# Patient Record
Sex: FEMALE | Race: WHITE | NOT HISPANIC OR LATINO | Employment: FULL TIME | URBAN - METROPOLITAN AREA
[De-identification: names, ages, dates, MRNs, and addresses within clinical notes are randomized per-mention and may not be internally consistent; named-entity substitution may affect disease eponyms.]

---

## 2023-12-21 ENCOUNTER — TELEPHONE (OUTPATIENT)
Dept: NEUROLOGY | Facility: CLINIC | Age: 28
End: 2023-12-21

## 2023-12-21 NOTE — TELEPHONE ENCOUNTER
Patricia Ribeiro called to sche a New Patient appt - no referral so created a self referral. Patient was triaged, sent over to provider, advised of process, referral status was updated, insurance was verified.    [employer] (not a medicaid plan)    [id number]    Group [id number]    Plan [id number]

## 2024-01-22 ENCOUNTER — TELEPHONE (OUTPATIENT)
Dept: NEUROLOGY | Facility: CLINIC | Age: 29
End: 2024-01-22

## 2024-01-23 ENCOUNTER — TELEPHONE (OUTPATIENT)
Dept: NEUROLOGY | Facility: CLINIC | Age: 29
End: 2024-01-23

## 2024-01-23 NOTE — TELEPHONE ENCOUNTER
Patient called to reschedule her appt for today. Patient was rescheduled for 02/15 at 1 pm with Dr. Stewart

## 2024-02-14 ENCOUNTER — TELEPHONE (OUTPATIENT)
Dept: NEUROLOGY | Facility: CLINIC | Age: 29
End: 2024-02-14
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
## 2024-02-15 ENCOUNTER — OFFICE VISIT (OUTPATIENT)
Dept: NEUROLOGY | Facility: CLINIC | Age: 29
End: 2024-02-15
Payer: COMMERCIAL

## 2024-02-15 VITALS
HEIGHT: 67 IN | WEIGHT: 169.8 LBS | HEART RATE: 62 BPM | BODY MASS INDEX: 26.65 KG/M2 | SYSTOLIC BLOOD PRESSURE: 120 MMHG | DIASTOLIC BLOOD PRESSURE: 80 MMHG

## 2024-02-15 DIAGNOSIS — R11.0 NAUSEA: ICD-10-CM

## 2024-02-15 DIAGNOSIS — G43.009 MIGRAINE WITHOUT AURA AND WITHOUT STATUS MIGRAINOSUS, NOT INTRACTABLE: Primary | ICD-10-CM

## 2024-02-15 PROCEDURE — 99204 OFFICE O/P NEW MOD 45 MIN: CPT | Performed by: PSYCHIATRY & NEUROLOGY

## 2024-02-15 RX ORDER — ONDANSETRON 4 MG/1
4 TABLET, FILM COATED ORAL AS NEEDED
Qty: 10 TABLET | Refills: 1 | Status: SHIPPED | OUTPATIENT
Start: 2024-02-15

## 2024-02-15 RX ORDER — NORETHINDRONE ACETATE AND ETHINYL ESTRADIOL, ETHINYL ESTRADIOL AND FERROUS FUMARATE 1MG-10(24)
1 KIT ORAL DAILY
COMMUNITY
Start: 2024-01-10 | End: 2025-01-09

## 2024-02-15 RX ORDER — VALACYCLOVIR HYDROCHLORIDE 500 MG/1
500 TABLET, FILM COATED ORAL DAILY
COMMUNITY
Start: 2024-01-10

## 2024-02-15 RX ORDER — MULTIVIT WITH CALCIUM,IRON,MIN
1 TABLET ORAL DAILY
COMMUNITY

## 2024-02-15 RX ORDER — RIZATRIPTAN BENZOATE 10 MG/1
10 TABLET ORAL
COMMUNITY
Start: 2024-01-10 | End: 2025-01-09

## 2024-02-15 RX ORDER — SUMATRIPTAN 100 MG/1
100 TABLET, FILM COATED ORAL ONCE AS NEEDED
COMMUNITY

## 2024-02-15 RX ORDER — PROTRIPTYLINE HYDROCHLORIDE 5 MG/1
5 TABLET, FILM COATED ORAL
Qty: 30 TABLET | Refills: 1 | Status: SHIPPED | OUTPATIENT
Start: 2024-02-15
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
## 2024-02-15 NOTE — ASSESSMENT & PLAN NOTE
28 y.o. right handed female who presents to the neurology clinic for evaluation of migraines that began in 2019 after childbirth. Vaginal delivery, no epidural use. Had migraines prior to childbirth as well, but very rare. Currently, has 2-4 severe migraines per month, but always has a mild headache every other day. Holocephalic, intense pressure sensation. Sometimes radiates down her neck. Has associated nausea, light sensitivity. No associated visual changes, no focal numbness or weakness, no dysphagia or dysarthria. Headache is significantly worse when she gets up or stands up. Patient used to follow with [facility] neuro for management of her headaches. She underwent MRI brain, MRA on 1/10/2023- both of which were negative. During an acute attack, patient has some level of disability: cannot work, has to call out. Has had to call out sick 2-3x/month.    Current medications:  -Preventative: none.   -Abortive: sumatriptan 100mg, rizatriptan 10mg    With medication, headache lasts for 2 hrs on average. Without medication, headache lasts for 2-3days on average.      Neuro exam shows mild L eye ptosis (chronic), but otherwise normal findings.     Impression:  Chronic migraine w/out aura. There seems to be some component of increased ICP as headache is better when lying down, but no radiographic evidence of high ICP. Therefore, she may be experiencing transient increase in ICP during migraine attacks.       Plan:   Abortive meds: Imitrex 100 mg/Maxalt 10 mg+ 600 mg Advil. If still having pain after 2 hours, can take another 1 tab of triptan. Zofran 4mg PRN.   Pt has both Imitrex, Maxalt and takes each med alternatively.   Preventative meds: Protriptyline 5mg QD at 2PM (avoided amitriptyline due to challenging work schedule and to avoid sleepiness). May increase to 10mg QD if not effective.   Of note, avoided topamax due to hx of kidney stones.  Counseled on abortive, preventative meds' side effects extensively; patient  verbalized understanding.  Limit OTC meds such as Tylenol, Ibuprofen, Aleve, Excedrin. (No more than 2- 3 times a week or max 10 a month).  Magnesium Oxide- 400mg QD, Vitamin B2- 200 mg BID supplements.  Maintain a regular schedule with adequate sleep (sleep hygiene), blue filter film screens for computers and phones, theraspecs or axon sunglasses (FL41 monty film), diet (not skipping/late meals), hydration (60-80 oz of water), exercise, decrease consumption of caffeine (less than 2 cups per day), limit artificial sweeteners, coping mechanisms for stress, CBTs and biofeedback.   Regular exercise   Maintain a migraine diary -document headaches.   F/u with Neurology in 3-4 months or sooner in case of worsening headaches or w/ new associated neurologic syx.

## 2024-02-15 NOTE — PATIENT INSTRUCTIONS
"Medication instructions:   Instructions for taking abortive meds (Imitrex/Maxalt):    -Take w/in 5 mins of headache onset   -May take an extra dose within 2 hrs of first dose. DO NOT take more than 2 pills in 24 hrs.   -DO NOT take more than 3 pills of abortive meds within 1 week due to high risk of rebound headaches.    Instructions for preventative meds (Vivactil 5mg at 2PM):   -Take every day even if you are not having a headache.   - All preventive treatments may take at least 4-6 weeks to start causing a change in your headache. The goal of preventive medication is to decrease the frequency of headache by at least 50% over a period of weeks to months.     General headache management instructions:   When you have a moderate to severe headache, you should seek rest, initiate relaxation and apply cold compresses to the head.  Limit over the counter medications such as Tylenol, Ibuprofen, Aleve, Excedrin. (No more than 2- 3 times a week or max 10 a month).  Maintain headache diary.  Free NEFTALI for a smart phone, which can be used is \"Migraine edelmira\"  Limit caffeine to 1-2 cups 8 to 16 oz a day or less.  Avoid dietary trigger. (aged cheese, peanuts, MSG, aspartame and nitrates)  Patient is to have regular frequent meals to prevent headache onset.    Please drink at least 64 ounces of water a day to help remain hydrated.    Medication overuse headaches:   Avoid medications with narcotics, barbiturates, or caffeine in them as these can cause rebound headaches after very few doses and can interfere with other headache medicine efficacy. Taking  any acute/abortive over the counter medication or prescription drugs for more than 2-3 days a week can cause medication overuse headache.     Non-pharmacologic treatments:   Maintain a regular schedule with adequate sleep (sleep hygiene), blue filter film screens for computers and phones, theraspecs or axon sunglasses (FL41 monty film), diet (not skipping/late meals), hydration " "(60-80 oz of water), exercise, decrease consumption of caffeine (less than 2 cups per day) and cut out artificial sweeteners, coping mechanisms for stress, and cognitive behavioral therapies and biofeedback.   Recommend exercising regularly.     Over-the-counter supplements:   Magnesium Oxide 400mg a day. If any diarrhea or upset stomach, decrease dose  as tolerated  Vitamin B2 200 mg twice a day. May cause urine to turn yellow which is normal for B 2 to do and is not a sign that you are dehydrated.       If you experience \"red flag\" headache symptoms including symptoms such as facial droop on one side, weakness/paralysis on either side, speech trouble, numbness on one side, balance issues, any vision changes, extreme dizziness or significant increase in severity of headache or a sudden onset severe headache, patient is to call 9-1-1 immediately or to proceed to the nearest ER.     Call our office in 1 month to inform us how the medication is working. Depending on how things are going, we can adjust the medications.   Return in about 3 months (around 5/15/2024).    "

## 2024-02-16 ENCOUNTER — APPOINTMENT (EMERGENCY)
Dept: RADIOLOGY | Facility: HOSPITAL | Age: 29
End: 2024-02-16
Payer: OTHER MISCELLANEOUS

## 2024-02-16 ENCOUNTER — HOSPITAL ENCOUNTER (EMERGENCY)
Facility: HOSPITAL | Age: 29
Discharge: HOME | End: 2024-02-16
Attending: EMERGENCY MEDICINE
Payer: OTHER MISCELLANEOUS

## 2024-02-16 VITALS
DIASTOLIC BLOOD PRESSURE: 85 MMHG | HEART RATE: 75 BPM | OXYGEN SATURATION: 97 % | RESPIRATION RATE: 18 BRPM | TEMPERATURE: 97.8 F | SYSTOLIC BLOOD PRESSURE: 145 MMHG

## 2024-02-16 DIAGNOSIS — S60.222A CONTUSION OF LEFT HAND, INITIAL ENCOUNTER: Primary | ICD-10-CM

## 2024-02-16 PROCEDURE — 99284 EMERGENCY DEPT VISIT MOD MDM: CPT | Performed by: EMERGENCY MEDICINE

## 2024-02-16 PROCEDURE — 73130 X-RAY EXAM OF HAND: CPT

## 2024-02-16 PROCEDURE — 99283 EMERGENCY DEPT VISIT LOW MDM: CPT

## 2024-02-16 RX ORDER — NAPROXEN 250 MG/1
250 TABLET ORAL 2 TIMES DAILY WITH MEALS
Qty: 20 TABLET | Refills: 0 | Status: SHIPPED | OUTPATIENT
Start: 2024-02-16

## 2024-02-16 RX ORDER — IBUPROFEN 600 MG/1
600 TABLET ORAL ONCE
Status: COMPLETED | OUTPATIENT
Start: 2024-02-16 | End: 2024-02-16

## 2024-02-16 RX ADMIN — IBUPROFEN 600 MG: 600 TABLET ORAL at 21:22

## 2024-02-16 NOTE — Clinical Note
Patricia Ribeiro was seen and treated in our emergency department on 2/16/2024.    No restrictions            Diagnosis:     Patricia  may return to work on return date.    She may return on this date: 02/16/2024         If you have any questions or concerns, please don't hesitate to call.      Maya Rooney, ASHISH    ______________________________           _______________          _______________  Hospital Representative                              Date                                Time

## 2024-02-17 NOTE — ED PROVIDER NOTES
Pt Name: Patricia Ribeiro  MRN: [id number]  Birthdate 1995  Age/Sex: 28 y.o. female  Date of evaluation: 2/16/2024  PCP: Reema Berman MD    CHIEF COMPLAINT    Chief Complaint   Patient presents with    Hand Injury     Pt reports closed L hand in door around 8pm.          HPI    28 y.o. female presenting with left hand pain and bruising.  Patient states that she closed her left hand in a door approximately an hour prior to evaluation.  The pain in the hand is dull, moderate intensity, overlying the third and fourth metacarpal, worse with pressing the area moving the hand and better at rest.  She denies weakness, any other pain or injuries, other symptoms.      HPI      Past Medical and Surgical History    Past Medical History:   Diagnosis Date    Migraine without aura and without status migrainosus, not intractable        History reviewed. No pertinent surgical history.    Family History   Problem Relation Age of Onset    Allergic rhinitis Mother        Social History     Tobacco Use    Smoking status: Never   Vaping Use    Vaping status: Never Used   Substance Use Topics    Alcohol use: Not Currently    Drug use: Never           Allergies    No Known Allergies    Home Medications    Prior to Admission medications    Medication Sig Start Date End Date Taking? Authorizing Provider   azelastine (ASTELIN) 0.1 % nasal spray 1 spray into each nostril 2 (two) times a day Use in each nostril as directed 6/1/22   Thomas Goldstein MD   ipratropium (ATROVENT) 0.06 % nasal spray 2 sprays into each nostril in the morning 6/1/22   Thomas Goldstein MD   Lo Loestrin Fe 1 MG-10 MCG / 10 MCG TABS Take 1 tablet by mouth daily 1/10/24 1/9/25  Historical Provider, MD   Multiple Vitamins-Minerals (Multiple Vitamins/Womens) tablet Take 1 tablet by mouth daily    Historical Provider, MD   ondansetron (Zofran) 4 mg tablet Take 1 tablet (4 mg total) by mouth if needed for nausea or vomiting 2/15/24   Jesusita Stewart MD    protriptyline (VIVACTIL) 5 MG tablet Take 1 tablet (5 mg total) by mouth daily at bedtime 2/15/24   Jesusita Stewart MD   rizatriptan (MAXALT) 10 mg tablet Take 10 mg by mouth 1/10/24 1/9/25  Historical Provider, MD   SUMAtriptan (IMITREX) 100 mg tablet Take 100 mg by mouth once as needed for migraine    Historical Provider, MD   valACYclovir (VALTREX) 500 mg tablet Take 500 mg by mouth daily 1/10/24   Historical Provider, MD           Review of Systems    Review of Systems   Constitutional:  Negative for activity change, chills and fever.   HENT:  Negative for drooling and facial swelling.    Eyes:  Negative for pain, discharge and visual disturbance.   Respiratory:  Negative for apnea, cough, chest tightness, shortness of breath and wheezing.    Cardiovascular:  Negative for chest pain and leg swelling.   Gastrointestinal:  Negative for abdominal pain, constipation, diarrhea, nausea and vomiting.   Genitourinary:  Negative for difficulty urinating, dysuria and urgency.   Musculoskeletal:  Positive for arthralgias. Negative for back pain and gait problem.   Skin:  Negative for color change and rash.   Neurological:  Negative for dizziness, speech difficulty, weakness and headaches.   Psychiatric/Behavioral:  Negative for agitation, behavioral problems and confusion.            All other systems reviewed and negative.    Physical Exam      ED Triage Vitals [02/16/24 2039]   Temperature Pulse Respirations Blood Pressure SpO2   97.8 °F (36.6 °C) 75 18 145/85 97 %      Temp Source Heart Rate Source Patient Position - Orthostatic VS BP Location FiO2 (%)   Temporal Monitor Sitting Right arm --      Pain Score       8               Physical Exam  Vitals and nursing note reviewed.   Constitutional:       General: She is not in acute distress.     Appearance: She is well-developed. She is not ill-appearing, toxic-appearing or diaphoretic.   HENT:      Head: Normocephalic and atraumatic.      Right Ear: External ear  normal.      Left Ear: External ear normal.      Nose: Nose normal. No congestion or rhinorrhea.      Mouth/Throat:      Mouth: Mucous membranes are moist.      Pharynx: Oropharynx is clear. No oropharyngeal exudate or posterior oropharyngeal erythema.   Eyes:      Conjunctiva/sclera: Conjunctivae normal.      Pupils: Pupils are equal, round, and reactive to light.   Cardiovascular:      Rate and Rhythm: Normal rate and regular rhythm.      Pulses: Normal pulses.      Heart sounds: Normal heart sounds.   Pulmonary:      Effort: Pulmonary effort is normal. No respiratory distress.      Breath sounds: Normal breath sounds. No wheezing or rales.   Abdominal:      General: There is no distension.      Palpations: Abdomen is soft.      Tenderness: There is no abdominal tenderness. There is no guarding or rebound.   Musculoskeletal:         General: Swelling and tenderness present. No deformity. Normal range of motion.      Cervical back: Normal range of motion and neck supple.      Comments: Swelling tenderness and bruising noted to the dorsal aspect of the left hand, no deformity, strength and station pulse and cap refill intact throughout the hand.  Compartments soft, no pain out of proportion.   Skin:     General: Skin is warm and dry.      Capillary Refill: Capillary refill takes less than 2 seconds.      Findings: No erythema or rash.   Neurological:      Mental Status: She is alert and oriented to person, place, and time.   Psychiatric:         Behavior: Behavior normal.         Thought Content: Thought content normal.         Judgment: Judgment normal.              Diagnostic Results      Labs:    Results Reviewed       None            All labs reviewed and utilized in the medical decision making process    Radiology:    XR hand 3+ views LEFT   ED Interpretation   No acute fracture or dislocation.                All radiology studies independently viewed by me and interpreted by the  radiologist.    Procedure    Procedures        ED Course of Care and Re-Assessments      Given ibuprofen for pain and swelling.    Medications   ibuprofen (MOTRIN) tablet 600 mg (600 mg Oral Given 2/16/24 2122)           FINAL IMPRESSION    Final diagnoses:   Contusion of left hand, initial encounter         DISPOSITION/PLAN    Presentation felt most consistent with contusion of the left hand status post crush injury.  Vital signs reassuring, examination as above.  Plain films reassuring.  Low suspicion for unstable fracture dislocation, compartment syndrome, critical neurovascular disruption, other acute threat to life or limb.  Treated symptomatically, discharged with strict return precautions, follow-up primary care doctor as well as orthopedics as needed if symptoms do not rapidly resolve.  Time reflects when diagnosis was documented in both MDM as applicable and the Disposition within this note       Time User Action Codes Description Comment    2/16/2024  9:13 PM Amilcar Angulo Add [S60.222A] Contusion of left hand, initial encounter           ED Disposition       ED Disposition   Discharge    Condition   Stable    Date/Time   Fri Feb 16, 2024  9:13 PM    Comment   Patricia Ribeiro discharge to home/self care.                   Follow-up Information       Follow up With Specialties Details Why Contact Info Additional Information    [facility] Emergency Department Emergency Medicine Call  As needed [address] [facility] [address] [phone] [facility] Emergency Department, [address] [place], [place], [address]    Reema Berman MD Family Medicine Call  As needed [address] [facility] [address] [phone]       [facility] Call  As needed, To discuss this visit and schedule close follow-up [address]  [address] [phone] [facility] [place], [address] [facility] [address], [place], [place], [address] [phone]              PATIENT REFERRED TO:    [facility] Emergency Department  [address] [facility] [address] [phone]  Call   As needed    Reema Berman MD  [address] [facility] [address]  [phone]    Call   As needed    [facility]  [address] [facility] [address]  [facility] [address] [phone]  Call   As needed, To discuss this visit and schedule close follow-up      DISCHARGE MEDICATIONS:    Discharge Medication List as of 2/16/2024  9:15 PM        START taking these medications    Details   naproxen (NAPROSYN) 250 mg tablet Take 1 tablet (250 mg total) by mouth 2 (two) times a day with meals, Starting Fri 2/16/2024, Print           CONTINUE these medications which have NOT CHANGED    Details   azelastine (ASTELIN) 0.1 % nasal spray 1 spray into each nostril 2 (two) times a day Use in each nostril as directed, Starting Wed 6/1/2022, Normal      ipratropium (ATROVENT) 0.06 % nasal spray 2 sprays into each nostril in the morning, Starting Wed 6/1/2022, Normal      Lo Loestrin Fe 1 MG-10 MCG / 10 MCG TABS Take 1 tablet by mouth daily, Starting Wed 1/10/2024, Until Thu 1/9/2025, Historical Med      Multiple Vitamins-Minerals (Multiple Vitamins/Womens) tablet Take 1 tablet by mouth daily, Historical Med      ondansetron (Zofran) 4 mg tablet Take 1 tablet (4 mg total) by mouth if needed for nausea or vomiting, Starting Thu 2/15/2024, Normal      protriptyline (VIVACTIL) 5 MG tablet Take 1 tablet (5 mg total) by mouth daily at bedtime, Starting Thu 2/15/2024, Normal      rizatriptan (MAXALT) 10 mg tablet Take 10 mg by mouth, Starting Wed 1/10/2024, Until Thu 1/9/2025 at 2359, Historical Med      SUMAtriptan (IMITREX) 100 mg tablet Take 100 mg by  "mouth once as needed for migraine, Historical Med      valACYclovir (VALTREX) 500 mg tablet Take 500 mg by mouth daily, Starting Wed 1/10/2024, Historical Med             No discharge procedures on file.         Amilcar Angulo MD    Portions of the record may have been created with voice recognition software.  Occasional wrong word or \"sound alike\" substitutions may have occurred due to the inherent limitations of voice recognition software.  Please read the chart carefully and recognize, using context, where substitutions have occurred     Amilcar Angulo MD  02/17/24 0156    "

## 2024-02-22 ENCOUNTER — OCCMED (OUTPATIENT)
Dept: URGENT CARE | Facility: CLINIC | Age: 29
End: 2024-02-22
Payer: OTHER MISCELLANEOUS

## 2024-02-22 ENCOUNTER — APPOINTMENT (OUTPATIENT)
Dept: RADIOLOGY | Facility: CLINIC | Age: 29
End: 2024-02-22
Payer: OTHER MISCELLANEOUS

## 2024-02-22 DIAGNOSIS — M79.642 LEFT HAND PAIN: ICD-10-CM

## 2024-02-22 DIAGNOSIS — M79.642 LEFT HAND PAIN: Primary | ICD-10-CM

## 2024-02-22 PROCEDURE — 99213 OFFICE O/P EST LOW 20 MIN: CPT

## 2024-02-22 PROCEDURE — 73130 X-RAY EXAM OF HAND: CPT

## 2024-02-23 ENCOUNTER — TELEPHONE (OUTPATIENT)
Dept: URGENT CARE | Facility: CLINIC | Age: 29
End: 2024-02-23

## 2024-03-13 ENCOUNTER — TELEPHONE (OUTPATIENT)
Dept: NEUROLOGY | Facility: CLINIC | Age: 29
End: 2024-03-13

## 2024-03-13 NOTE — TELEPHONE ENCOUNTER
received vm 3/11 at 5:14pm-I my name is Patricia parrish. I'm calling to speak to somebody about the medication that i've been started on at the supposed to help with migraines, but I just had a 3 day migraine That was excruciatingly bad. And I hadn't taken the medicine shane I was taking some other medicine to help with the migraine. If somebody could give me a call back, that would be cool. My number is [phone]. I would appreciate it. Thank you.  ---------------------------------------------------  Called pt.  States that she was started on protriptyline 5mg.  States that pcp prescribed rizatriptan and sumatriptan and she was scared to take protriptyline with the rizatriptan or sumatriptan.    Advised that protriptyline is for prevention and others are for abortive and we would want her to take protritpyline daily for prevention and use the other to abort her migraine.  Recommended that she restart prortriptyline as she has not taken it as she was waiting to hear back from our office. Verbalized understanding.

## 2024-05-08 ENCOUNTER — TELEPHONE (OUTPATIENT)
Dept: NEUROLOGY | Facility: CLINIC | Age: 29
End: 2024-05-08

## 2024-05-08 NOTE — TELEPHONE ENCOUNTER
Spoke with patient to confirm appointment with Dr. Stewart 5/16 at 430 pm at [facility]. Patient asked to have appointment rescheduled, provided patient with new date. Patient accepted change.

## 2024-05-29 ENCOUNTER — TELEPHONE (OUTPATIENT)
Dept: NEUROLOGY | Facility: CLINIC | Age: 29
End: 2024-05-29

## 2024-05-29 NOTE — TELEPHONE ENCOUNTER
Spoke with patient and confirmed appointment with Dr. Stewart 5/30 430 pm at [facility].

## 2024-05-30 ENCOUNTER — TELEMEDICINE (OUTPATIENT)
Dept: NEUROLOGY | Facility: CLINIC | Age: 29
End: 2024-05-30
Payer: COMMERCIAL

## 2024-05-30 VITALS — WEIGHT: 178 LBS | HEIGHT: 67 IN | BODY MASS INDEX: 27.94 KG/M2

## 2024-05-30 DIAGNOSIS — R51.9 WORSENING HEADACHES: Primary | ICD-10-CM

## 2024-05-30 DIAGNOSIS — G43.009 MIGRAINE WITHOUT AURA AND WITHOUT STATUS MIGRAINOSUS, NOT INTRACTABLE: ICD-10-CM

## 2024-05-30 DIAGNOSIS — R61 NIGHT SWEATS: ICD-10-CM

## 2024-05-30 PROCEDURE — 99215 OFFICE O/P EST HI 40 MIN: CPT | Performed by: PSYCHIATRY & NEUROLOGY

## 2024-05-30 RX ORDER — FLUTICASONE PROPIONATE 50 MCG
SPRAY, SUSPENSION (ML) NASAL
COMMUNITY
Start: 2024-05-09

## 2024-05-30 RX ORDER — ONDANSETRON 4 MG/1
4 TABLET, FILM COATED ORAL AS NEEDED
Qty: 10 TABLET | Refills: 1 | Status: SHIPPED | OUTPATIENT
Start: 2024-05-30

## 2024-05-30 RX ORDER — DEXAMETHASONE 2 MG/1
2 TABLET ORAL
Qty: 5 TABLET | Refills: 0 | Status: SHIPPED | OUTPATIENT
Start: 2024-05-30 | End: 2024-06-04

## 2024-05-30 NOTE — PATIENT INSTRUCTIONS
"Complete an MRI brain.  If you get a really bad migraine that is not breaking with Maxalt, take decadron 2mg for five days and then stop.  Take Zofran as needed for nausea associated with migraines.    Instructions for taking Maxalt:    -Take w/in 5 mins of headache onset   -May take an extra dose within 2 hrs of first dose. DO NOT take more than 2 pills in 24 hrs.   -DO NOT take more than 3 pills of abortive meds within 1 week due to high risk of rebound headaches.    Instructions for Nurtec:   -Take every other day even if you are not having a headache.   - All preventive treatments may take at least 4-6 weeks to start causing a change in your headache. The goal of preventive medication is to decrease the frequency of headache by at least 50% over a period of weeks to months.   Go online to [email or web address].com and sign up for coupon card.     General headache management instructions:   When you have a moderate to severe headache, you should seek rest, initiate relaxation and apply cold compresses to the head.  Limit over the counter medications such as Tylenol, Ibuprofen, Aleve, Excedrin. (No more than 2- 3 times a week or max 10 a month).  Maintain headache diary.  Free NEFTALI for a smart phone, which can be used is \"Migraine buddy\"  Limit caffeine to 1-2 cups 8 to 16 oz a day or less.  Avoid dietary trigger. (aged cheese, peanuts, MSG, aspartame and nitrates)  Patient is to have regular frequent meals to prevent headache onset.    Please drink at least 64 ounces of water a day to help remain hydrated.    Medication overuse headaches:   Avoid medications with narcotics, barbiturates, or caffeine in them as these can cause rebound headaches after very few doses and can interfere with other headache medicine efficacy. Taking  any acute/abortive over the counter medication or prescription drugs for more than 2-3 days a week can cause medication overuse headache.     Non-pharmacologic treatments:   Maintain a regular schedule " "with adequate sleep (sleep hygiene), blue filter film screens for computers and phones, theraspecs or axon sunglasses (FL41 monty film), diet (not skipping/late meals), hydration (60-80 oz of water), exercise, decrease consumption of caffeine (less than 2 cups per day) and cut out artificial sweeteners, coping mechanisms for stress, and cognitive behavioral therapies and biofeedback.   Recommend exercising regularly.     Over-the-counter supplements:   Magnesium Oxide 400mg a day. If any diarrhea or upset stomach, decrease dose  as tolerated  Vitamin B2 200 mg twice a day. May cause urine to turn yellow which is normal for B 2 to do and is not a sign that you are dehydrated.       If you experience \"red flag\" headache symptoms including symptoms such as facial droop on one side, weakness/paralysis on either side, speech trouble, numbness on one side, balance issues, any vision changes, extreme dizziness or significant increase in severity of headache or a sudden onset severe headache, patient is to call 9-1-1 immediately or to proceed to the nearest ER.         Return in about 3 months (around 8/30/2024).    "
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
## 2024-07-15 ENCOUNTER — TELEPHONE (OUTPATIENT)
Age: 29
End: 2024-07-15

## 2024-07-15 DIAGNOSIS — G43.009 MIGRAINE WITHOUT AURA AND WITHOUT STATUS MIGRAINOSUS, NOT INTRACTABLE: ICD-10-CM

## 2024-07-15 NOTE — TELEPHONE ENCOUNTER
Pt called. The Nurtec that was prescribed on 5/30/24 requires a prior authorization. Routed to the clinical team to assist.

## 2024-07-19 NOTE — TELEPHONE ENCOUNTER
[place] starting 7/18/2024.     No end date provided in approval letter.    LVM informing pharmacy of approval.

## 2024-09-30 ENCOUNTER — NURSE TRIAGE (OUTPATIENT)
Age: 29
End: 2024-09-30

## 2024-09-30 NOTE — TELEPHONE ENCOUNTER
"Inbound call received from [facility] to clarify the dosing on Nurtec. Annaliset made aware per Nurtec order: Take 1 tablet (75 mg total) by mouth every other day.    Patient also made aware on Nurtec.com it states to Take Nurtec ODT every other day to help prevent migraine attacks.    Patient denied having additional questions at this time.     Dr. Stewart: routed for your awareness. Please advise if needed. Thank you!      Reason for Disposition   Caller has medicine question only, adult not sick, and triager answers question    Answer Assessment - Initial Assessment Questions  1. NAME of MEDICATION: \"What medicine are you calling about?\"      Nurtec    2. QUESTION: \"What is your question?\" (e.g., medication refill, side effect)      Patient wanted to clarify she should be taking Nurtec every other day because she knows someone else on it that only takes it when they get a migraine.     3. PRESCRIBING HCP: \"Who prescribed it?\" Reason: if prescribed by specialist, call should be referred to that group.      Dr. Stewart    Protocols used: Medication Question Call-ADULT-[place]    "

## 2025-01-27 DIAGNOSIS — G43.009 MIGRAINE WITHOUT AURA AND WITHOUT STATUS MIGRAINOSUS, NOT INTRACTABLE: ICD-10-CM

## 2025-01-27 NOTE — TELEPHONE ENCOUNTER
Patient requesting new rx for Nurtec 75mg. She was unable to request via [employer].    Nurtec 75mg - 1 tab qOD     LOV - 5/2024    Last rx - 5/30/24 for a 6 month supply    Per 5/30/24 LOV note:   Return in about 3 months (around 8/30/2024)     Scheduling - please reach out to patient to schedule f/u per Dr. Stewart's note above. Thank you.     Script pended below.     Dr. Stewart - please sign if agreeable. Thank you.

## 2025-02-20 ENCOUNTER — TELEPHONE (OUTPATIENT)
Age: 30
End: 2025-02-20

## 2025-02-21 ENCOUNTER — NURSE TRIAGE (OUTPATIENT)
Age: 30
End: 2025-02-21

## 2025-02-21 NOTE — TELEPHONE ENCOUNTER
"Last visit Dr. Stewart, 5/30  F/u scheduled 3/13    Patient reporting headache that started yesterday and progressively worsening, \"kept me up all night\" currently 7/10.  Restarted nurtec yesterday after being without med for 1.5 months. Also reporting moist productive cough and body aches.    Discussed patient to call PCP to assess cold/flu symptoms;  Advised can take nurtec every 24 hours and zofran for nausea. Patient verbalized understanding.     Please provide recommendation, thank you.    [facility]     Reason for Disposition   Similar to previously diagnosed migraine headaches    Answer Assessment - Initial Assessment Questions  When did migraine start? Yesterday, progressively worsening, unable to sleep last night    Location/Description: back of neck/head sides head b/l    Pain scale: 7    Associated symptoms:lightheaded, nausea (has not taken zofran), CTS noted moist cough while on phone , patient reporting recent illness that started yesterday, symptoms include, cough productive for yellow phlegm, body aches, denies fever     Precipitating factors: possible recent illness but not sure; also said she was without nurtec x 1.5 months and just restarted yesterday    Alleviating factors: rest, bath, increasing water    What medications have you tried for this migraine headache? Last dose nurtec was 11 PM yesterday.     Current migraine medications are confirmed as:  Nurtec, just started again yesterday    Medications tried in the past? Does not recall if decadron was effective in the past    Protocols used: Headache-Adult-[place]    "

## 2025-02-21 NOTE — TELEPHONE ENCOUNTER
Received approval letter for Nurtec 75 mg.  Approved until 2/20/26    LMOM at [facility] stating [facility] was approved. Provided office number to call back with any questions.     Called and informed pt. Pt thankful for the call back.

## 2025-02-24 NOTE — TELEPHONE ENCOUNTER
Spoke with pt and advised her of Dr. Stewart's response.    Pt says she continues to take the Nurtec every other day.    Pt says she actually had dexamethasone from a past script. Pt took one of these and this helped take the headache away.    Pt says headache has subsided and she feels better now. Nothing further at this time.

## 2025-02-24 NOTE — TELEPHONE ENCOUNTER
"Jesusita Stewart MD to Maya Mackay RN  Neurology [facility]    2/21/25  4:26 PM   Agree with your recommendation regarding Nurtec.    Please advise her of the following:    In case of \"red flag\" headache symptoms such as facial droop on one side, weakness/paralysis on either side, speech trouble, numbness on one side, balance issues, any vision changes, extreme dizziness or significant increase in severity of headache she should call 9-1-1 immediately or to proceed to the nearest ER.  "

## 2025-02-28 NOTE — TELEPHONE ENCOUNTER
Jesusita Stewart MD to Me   SK    2/25/25  4:31 PM   Nurtec 75mg PO should be taken every other day for prevention. NOT everyday. Please let her know.    Thank you !   Smiling or inexpressive Smiling or inexpressive Smiling or inexpressive

## 2025-03-14 ENCOUNTER — HOSPITAL ENCOUNTER (OUTPATIENT)
Dept: MRI IMAGING | Facility: CLINIC | Age: 30
Discharge: HOME | End: 2025-03-14
Payer: COMMERCIAL

## 2025-03-14 DIAGNOSIS — G43.009 MIGRAINE WITHOUT AURA AND WITHOUT STATUS MIGRAINOSUS, NOT INTRACTABLE: ICD-10-CM

## 2025-03-14 DIAGNOSIS — R51.9 WORSENING HEADACHES: ICD-10-CM

## 2025-03-14 DIAGNOSIS — R61 NIGHT SWEATS: ICD-10-CM

## 2025-03-14 PROCEDURE — 70553 MRI BRAIN STEM W/O & W/DYE: CPT

## 2025-03-14 PROCEDURE — A9585 GADOBUTROL INJECTION: HCPCS

## 2025-03-14 RX ORDER — GADOBUTROL 604.72 MG/ML
8 INJECTION INTRAVENOUS
Status: COMPLETED | OUTPATIENT
Start: 2025-03-14 | End: 2025-03-14

## 2025-03-14 RX ADMIN — GADOBUTROL 8 ML: 604.72 INJECTION INTRAVENOUS at 12:26

## 2025-05-01 ENCOUNTER — OFFICE VISIT (OUTPATIENT)
Dept: NEUROLOGY | Facility: CLINIC | Age: 30
End: 2025-05-01
Payer: COMMERCIAL

## 2025-05-01 VITALS
SYSTOLIC BLOOD PRESSURE: 118 MMHG | WEIGHT: 180 LBS | HEART RATE: 52 BPM | OXYGEN SATURATION: 98 % | BODY MASS INDEX: 28.25 KG/M2 | HEIGHT: 67 IN | DIASTOLIC BLOOD PRESSURE: 70 MMHG

## 2025-05-01 DIAGNOSIS — G43.009 MIGRAINE WITHOUT AURA AND WITHOUT STATUS MIGRAINOSUS, NOT INTRACTABLE: Primary | ICD-10-CM

## 2025-05-01 PROCEDURE — 99214 OFFICE O/P EST MOD 30 MIN: CPT | Performed by: PSYCHIATRY & NEUROLOGY
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
## 2025-05-01 NOTE — PATIENT INSTRUCTIONS
Ubrelvy: Take 1 tablet (50 mg) one time as needed for migraine. May repeat one additional tablet (50 mg) at least two hours after the first dose. Do not use more than two doses per day, or for more than eight days per month.    Nurtec 75mg every other day.    In case of any new strokelike symptoms such as facial droop on one side, weakness/paralysis on either side, speech trouble, numbness on one side, balance issues, any vision changes, extreme dizziness or any new onset, severe headache, immediately call 9-1-1 immediately or proceed to the nearest ER immediately.       Limit OTC meds such as Tylenol, Ibuprofen, Aleve, Excedrin. (No more than 2- 3 times a week or max 10 a month).  Magnesium Oxide- 400mg QD, Vitamin B2- 200 mg BID supplements.  Maintain a regular schedule with adequate sleep (sleep hygiene), blue filter film screens for computers and phones, theraspecs or axon sunglasses (FL41 monty film), diet (not skipping/late meals), hydration (60-80 oz of water), exercise, decrease consumption of caffeine (less than 2 cups per day), limit artificial sweeteners, coping mechanisms for stress, CBTs and biofeedback.   Regular exercise   Maintain a migraine diary -document headaches.     Return in about 3 months (around 8/1/2025).

## 2025-05-15 ENCOUNTER — TELEPHONE (OUTPATIENT)
Age: 30
End: 2025-05-15

## 2025-05-15 NOTE — TELEPHONE ENCOUNTER
Recd call from pt stating that Ubrelvy 50 mg needs a PA.     Ubrelvy 50 mg initiated via promptpa   Prior Auth (EOC) ID:  [id number]    Waiting for determination

## 2025-05-20 NOTE — TELEPHONE ENCOUNTER
Recd approval letter for Ubrelvy 50 mg. Approval letter scanned in media. Effective date of 5/15/25 but no end date listed.   Notified [facility].   [employer] message sent to pt

## 2025-06-11 ENCOUNTER — HOSPITAL ENCOUNTER (EMERGENCY)
Facility: HOSPITAL | Age: 30
Discharge: HOME | End: 2025-06-12
Attending: EMERGENCY MEDICINE
Payer: OTHER MISCELLANEOUS

## 2025-06-11 VITALS
HEART RATE: 68 BPM | TEMPERATURE: 97.8 F | OXYGEN SATURATION: 100 % | HEIGHT: 67 IN | SYSTOLIC BLOOD PRESSURE: 140 MMHG | WEIGHT: 174 LBS | RESPIRATION RATE: 16 BRPM | DIASTOLIC BLOOD PRESSURE: 83 MMHG | BODY MASS INDEX: 27.31 KG/M2
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
## 2025-06-11 DIAGNOSIS — S00.83XA CONTUSION OF FACE: ICD-10-CM

## 2025-06-11 DIAGNOSIS — S19.80XA BLUNT TRAUMA OF NECK, INITIAL ENCOUNTER: ICD-10-CM

## 2025-06-11 DIAGNOSIS — S10.91XA ABRASION, NECK W/O INFECTION: ICD-10-CM

## 2025-06-11 LAB
BASOPHILS # BLD AUTO: 0.05 THOUSANDS/ÂΜL (ref 0–0.1)
BASOPHILS NFR BLD AUTO: 1 % (ref 0–1)
EOSINOPHIL # BLD AUTO: 0.12 THOUSAND/ÂΜL (ref 0–0.61)
EOSINOPHIL NFR BLD AUTO: 2 % (ref 0–6)
ERYTHROCYTE [DISTWIDTH] IN BLOOD BY AUTOMATED COUNT: 11.5 % (ref 11.6–15.1)
HCT VFR BLD AUTO: 41.9 % (ref 34.8–46.1)
HGB BLD-MCNC: 14.3 G/DL (ref 11.5–15.4)
IMM GRANULOCYTES # BLD AUTO: 0.02 THOUSAND/UL (ref 0–0.2)
IMM GRANULOCYTES NFR BLD AUTO: 0 % (ref 0–2)
LYMPHOCYTES # BLD AUTO: 2.14 THOUSANDS/ÂΜL (ref 0.6–4.47)
LYMPHOCYTES NFR BLD AUTO: 28 % (ref 14–44)
MCH RBC QN AUTO: 31.8 PG (ref 26.8–34.3)
MCHC RBC AUTO-ENTMCNC: 34.1 G/DL (ref 31.4–37.4)
MCV RBC AUTO: 93 FL (ref 82–98)
MONOCYTES # BLD AUTO: 0.63 THOUSAND/ÂΜL (ref 0.17–1.22)
MONOCYTES NFR BLD AUTO: 8 % (ref 4–12)
NEUTROPHILS # BLD AUTO: 4.71 THOUSANDS/ÂΜL (ref 1.85–7.62)
NEUTS SEG NFR BLD AUTO: 61 % (ref 43–75)
NRBC BLD AUTO-RTO: 0 /100 WBCS
PLATELET # BLD AUTO: 279 THOUSANDS/UL (ref 149–390)
PMV BLD AUTO: 10.2 FL (ref 8.9–12.7)
RBC # BLD AUTO: 4.49 MILLION/UL (ref 3.81–5.12)
WBC # BLD AUTO: 7.67 THOUSAND/UL (ref 4.31–10.16)

## 2025-06-11 PROCEDURE — 99285 EMERGENCY DEPT VISIT HI MDM: CPT | Performed by: EMERGENCY MEDICINE

## 2025-06-11 PROCEDURE — 80048 BASIC METABOLIC PNL TOTAL CA: CPT | Performed by: EMERGENCY MEDICINE

## 2025-06-11 PROCEDURE — 84703 CHORIONIC GONADOTROPIN ASSAY: CPT | Performed by: EMERGENCY MEDICINE

## 2025-06-11 PROCEDURE — 85025 COMPLETE CBC W/AUTO DIFF WBC: CPT | Performed by: EMERGENCY MEDICINE

## 2025-06-11 PROCEDURE — 99284 EMERGENCY DEPT VISIT MOD MDM: CPT

## 2025-06-11 PROCEDURE — 36415 COLL VENOUS BLD VENIPUNCTURE: CPT | Performed by: EMERGENCY MEDICINE

## 2025-06-11 PROCEDURE — 90471 IMMUNIZATION ADMIN: CPT

## 2025-06-11 RX ORDER — GINSENG 100 MG
1 CAPSULE ORAL ONCE
Status: COMPLETED | OUTPATIENT
Start: 2025-06-12 | End: 2025-06-12

## 2025-06-12 ENCOUNTER — APPOINTMENT (EMERGENCY)
Dept: CT IMAGING | Facility: HOSPITAL | Age: 30
End: 2025-06-12
Payer: OTHER MISCELLANEOUS

## 2025-06-12 LAB
ANION GAP SERPL CALCULATED.3IONS-SCNC: 7 MMOL/L (ref 4–13)
BUN SERPL-MCNC: 15 MG/DL (ref 5–25)
CALCIUM SERPL-MCNC: 8.9 MG/DL (ref 8.4–10.2)
CHLORIDE SERPL-SCNC: 100 MMOL/L (ref 96–108)
CO2 SERPL-SCNC: 30 MMOL/L (ref 21–32)
CREAT SERPL-MCNC: 0.79 MG/DL (ref 0.6–1.3)
GFR SERPL CREATININE-BSD FRML MDRD: 102 ML/MIN/1.73SQ M
GLUCOSE SERPL-MCNC: 90 MG/DL (ref 65–140)
HCG SERPL QL: NEGATIVE
POTASSIUM SERPL-SCNC: 3.7 MMOL/L (ref 3.5–5.3)
SODIUM SERPL-SCNC: 137 MMOL/L (ref 135–147)

## 2025-06-12 PROCEDURE — 70450 CT HEAD/BRAIN W/O DYE: CPT

## 2025-06-12 PROCEDURE — 90715 TDAP VACCINE 7 YRS/> IM: CPT | Performed by: EMERGENCY MEDICINE

## 2025-06-12 PROCEDURE — 70486 CT MAXILLOFACIAL W/O DYE: CPT

## 2025-06-12 PROCEDURE — 70498 CT ANGIOGRAPHY NECK: CPT

## 2025-06-12 RX ADMIN — BACITRACIN ZINC 1 LARGE APPLICATION: 500 OINTMENT TOPICAL at 00:00

## 2025-06-12 RX ADMIN — IOHEXOL 85 ML: 350 INJECTION, SOLUTION INTRAVENOUS at 00:22

## 2025-06-12 RX ADMIN — TETANUS TOXOID, REDUCED DIPHTHERIA TOXOID AND ACELLULAR PERTUSSIS VACCINE, ADSORBED 0.5 ML: 5; 2.5; 8; 8; 2.5 SUSPENSION INTRAMUSCULAR at 00:00
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
